# Patient Record
Sex: FEMALE | Race: WHITE | NOT HISPANIC OR LATINO | URBAN - METROPOLITAN AREA
[De-identification: names, ages, dates, MRNs, and addresses within clinical notes are randomized per-mention and may not be internally consistent; named-entity substitution may affect disease eponyms.]

---
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
## 2024-06-04 ENCOUNTER — NON-APPOINTMENT (OUTPATIENT)
Age: 19
End: 2024-06-04

## 2024-06-05 ENCOUNTER — APPOINTMENT (OUTPATIENT)
Dept: NEUROLOGY | Facility: CLINIC | Age: 19
End: 2024-06-05
Payer: COMMERCIAL

## 2024-06-05 VITALS
HEIGHT: 66 IN | SYSTOLIC BLOOD PRESSURE: 119 MMHG | DIASTOLIC BLOOD PRESSURE: 77 MMHG | WEIGHT: 151 LBS | OXYGEN SATURATION: 97 % | TEMPERATURE: 97.9 F | HEART RATE: 76 BPM | BODY MASS INDEX: 24.27 KG/M2

## 2024-06-05 DIAGNOSIS — Z86.39 PERSONAL HISTORY OF OTHER ENDOCRINE, NUTRITIONAL AND METABOLIC DISEASE: ICD-10-CM

## 2024-06-05 DIAGNOSIS — Z82.0 FAMILY HISTORY OF EPILEPSY AND OTHER DISEASES OF THE NERVOUS SYSTEM: ICD-10-CM

## 2024-06-05 DIAGNOSIS — Z78.9 OTHER SPECIFIED HEALTH STATUS: ICD-10-CM

## 2024-06-05 DIAGNOSIS — R25.9 UNSPECIFIED ABNORMAL INVOLUNTARY MOVEMENTS: ICD-10-CM

## 2024-06-05 DIAGNOSIS — Z84.89 FAMILY HISTORY OF OTHER SPECIFIED CONDITIONS: ICD-10-CM

## 2024-06-05 DIAGNOSIS — M54.2 CERVICALGIA: ICD-10-CM

## 2024-06-05 DIAGNOSIS — H57.11 OCULAR PAIN, RIGHT EYE: ICD-10-CM

## 2024-06-05 PROCEDURE — 99205 OFFICE O/P NEW HI 60 MIN: CPT

## 2024-06-05 PROCEDURE — G2211 COMPLEX E/M VISIT ADD ON: CPT | Mod: NC,1L

## 2024-06-05 RX ORDER — NORETHINDRONE ACETATE AND ETHINYL ESTRADIOL, AND FERROUS FUMARATE 1MG-20(24)
KIT ORAL
Refills: 0 | Status: ACTIVE | COMMUNITY

## 2024-06-05 RX ORDER — INSULIN LISPRO 100 [IU]/ML
INJECTION, SOLUTION INTRAVENOUS; SUBCUTANEOUS
Refills: 0 | Status: ACTIVE | COMMUNITY

## 2024-06-05 RX ORDER — LEVOTHYROXINE SODIUM 88 UG/1
88 TABLET ORAL
Refills: 0 | Status: ACTIVE | COMMUNITY

## 2024-06-05 NOTE — HISTORY OF PRESENT ILLNESS
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  Patient sustained bites on the inside of her cheek bilaterally.  This only occurred at night.  It resolved after a couple of weeks.  About 3 weeks ago patient started experiencing neck pain with no radiation down her neck.  Patient also experienced right arm muscle twitches along with a stabbing sensation in the forearm and medial hand.  Patient states that she also has experienced right eye pain that was not associated with movement.  Currently the symptoms have resolved.

## 2024-06-05 NOTE — DISCUSSION/SUMMARY
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  Will check EEG for possible seizure activity.  With her right arm muscle twitching will have her return for nerve conduction and EMG studies for evaluation of benign fasciculation syndrome.  Patient also has neck pain but with no radiation and I suspect that must be musculoskeletal pain.  Patient will obtain an MRI of the brain with and without contrast given the pain in the right eye and the family history of multiple sclerosis.  Similarly will check MRI of the cervical spine with and without contrast for any lesions that might be causing her neck pain and muscle twitching.  I spent the time noted on the day of this patient encounter preparing for, review of medical records,review of pertinent diagnostic studies, providing and documenting the above E/M service and counseling and educate patient on differential, workup, disease course, and treatment/management. Education was provided to the patient during this encounter. All questions and concerns were answered and addressed in detail. The patient verbalized understanding and agreed to plan. Patient was advised to continue to monitor for neurologic symptoms and to notify my office or go to the nearest emergency room if there are any changes. Any orders/referrals and communications were provided as well. Side effects of the above medications were discussed in detail including but not limited to applicable black box warning and teratogenicity as appropriate. Patient was advised to bring previous records to my office.

## 2024-06-05 NOTE — PHYSICAL EXAM
[General Appearance - Alert] : alert [Oriented To Time, Place, And Person] : oriented to person, place, and time [Person] : oriented to person [Place] : oriented to place [Time] : oriented to time [Short Term Intact] : short term memory intact [Fluency] : fluency intact [Current Events] : adequate knowledge of current events [Cranial Nerves Optic (II)] : visual acuity intact bilaterally,  visual fields full to confrontation, pupils equal round and reactive to light [Cranial Nerves Oculomotor (III)] : extraocular motion intact [Cranial Nerves Trigeminal (V)] : facial sensation intact symmetrically [Cranial Nerves Facial (VII)] : face symmetrical [Cranial Nerves Vestibulocochlear (VIII)] : hearing was intact bilaterally [Cranial Nerves Accessory (XI - Cranial And Spinal)] : head turning and shoulder shrug symmetric [Motor Tone] : muscle tone was normal in all four extremities [Cranial Nerves Hypoglossal (XII)] : there was no tongue deviation with protrusion [Motor Strength] : muscle strength was normal in all four extremities [Sensation Tactile Decrease] : light touch was intact [Abnormal Walk] : normal gait [Coordination - Dysmetria Impaired Finger-to-Nose Bilateral] : not present [2+] : Patella left 2+

## 2024-06-12 ENCOUNTER — RESULT REVIEW (OUTPATIENT)
Age: 19
End: 2024-06-12

## 2024-06-19 ENCOUNTER — APPOINTMENT (OUTPATIENT)
Dept: MRI IMAGING | Facility: CLINIC | Age: 19
End: 2024-06-19

## 2024-06-19 PROCEDURE — 0866T QUAN MRI ALYS BRN W/DX MRI: CPT

## 2024-06-19 PROCEDURE — A9585: CPT | Mod: NC,JW

## 2024-06-19 PROCEDURE — 70553 MRI BRAIN STEM W/O & W/DYE: CPT

## 2024-06-19 PROCEDURE — 72156 MRI NECK SPINE W/O & W/DYE: CPT

## 2024-06-21 ENCOUNTER — NON-APPOINTMENT (OUTPATIENT)
Age: 19
End: 2024-06-21

## 2024-07-11 ENCOUNTER — TRANSCRIPTION ENCOUNTER (OUTPATIENT)
Age: 19
End: 2024-07-11

## 2024-07-11 ENCOUNTER — APPOINTMENT (OUTPATIENT)
Dept: NEUROLOGY | Facility: CLINIC | Age: 19
End: 2024-07-11
Payer: COMMERCIAL

## 2024-07-11 PROCEDURE — 95816 EEG AWAKE AND DROWSY: CPT

## 2024-07-31 ENCOUNTER — APPOINTMENT (OUTPATIENT)
Dept: NEUROLOGY | Facility: CLINIC | Age: 19
End: 2024-07-31
Payer: COMMERCIAL

## 2024-07-31 VITALS
BODY MASS INDEX: 24.27 KG/M2 | TEMPERATURE: 97.9 F | HEIGHT: 66 IN | DIASTOLIC BLOOD PRESSURE: 78 MMHG | WEIGHT: 151 LBS | HEART RATE: 79 BPM | SYSTOLIC BLOOD PRESSURE: 118 MMHG

## 2024-07-31 DIAGNOSIS — R25.9 UNSPECIFIED ABNORMAL INVOLUNTARY MOVEMENTS: ICD-10-CM

## 2024-07-31 PROCEDURE — 95910 NRV CNDJ TEST 7-8 STUDIES: CPT

## 2024-07-31 PROCEDURE — 95886 MUSC TEST DONE W/N TEST COMP: CPT

## 2024-07-31 NOTE — PROCEDURE
[FreeTextEntry1] :    [facility] Neurology and Electrophysiology  Nerve Conduction & EMG Report      Full Name:	Angie Bernardo	Gender:	Female MRN:	[id number]	YOB: 2005    Visit Date:	7/31/2024 13:34 Age:	19 Years Examining Physician:	Miladis Buchanan MD Referring Physician:	Miladis Buchanan MD Height:	5 feet 6 inch Weight:	151 lbs    ________________________________________  Conclusion:   Sensory nerve conductions Bilateral median and bilateral ulnar sensory nerve action potential had normal latency, normal amplitude and normal conduction velocity.  Motor nerve conductions Bilateral median and right ulnar nerve compound motor action potential had normal latency, normal amplitude and normal conduction velocity. F-wave latency of the bilateral median and right ulnar nerves were within normal limits.  Needle EMG Needle EMG was performed using a disposable concentric needle in the following muscles:  Deltoid, biceps, edc, fdi, pronator teres had no spontaneous activity and normal motor units.   Summary: normal study. There is no electrophysiologic evidence of large fiber neuropathy, myopathy or cervical radiculopathy.  Patient was not noted to have any fasciculations on exam.  Patient will follow-up with me.Clinical and radiologic correlation is advised.  Thank you for the consult, Miladis Buchanan MD Diplomat, American Board of Neurology and Psychiatry  ________________________________________           Sensory NCS    Nerve / Sites	Rec. Site	Onset Lat	Peak Lat	NP Amp	PP Amp	Segments	Distance	Velocity 		ms	ms	V	V		cm	m/s R Median - Dig II (Antidromic)    Wrist	Index	2.06	2.65	47.8	95.9	Wrist - Index	11	53 L Median - Dig II (Antidromic)    Wrist	Index	2.15	2.60	34.5	101.5	Wrist - Index	12	56 R Ulnar - Dig V (Antidromic)    Wrist	Dig V	1.88	2.35	24.5	164.7	Wrist - Dig V	10	53 L Ulnar - Dig V (Antidromic)    Wrist	Dig V	1.65	2.31	90.7	68.7	Wrist - Dig V	10	61    Motor NCS    Nerve / Sites	Muscle	Latency	Amplitude	Rel Amp	Segments	Distance	Lat Diff	Velocity	Durat 		ms	mV	%		cm	ms	m/s	ms R Median - APB    Wrist	APB	2.15	10.8		Wrist - APB	8			6.69    Elbow	APB	6.08	9.9	91.8	Elbow - Wrist	21	3.94	53	7.06 L Median - APB    Wrist	APB	2.15	20.3		Wrist - APB	5			6.33    Elbow	APB	6.21	14.8	72.9	Elbow - Wrist	23	4.06	57	6.85 R Ulnar - ADM    Wrist	ADM	2.02	13.8	100	Wrist - ADM	5			6.81    B.Elbow	ADM	6.23	13.5	98.2	B.Elbow - Wrist	22	4.21	52	7.04    A.Elbow	ADM	7.48	14.0	103	A.Elbow - B.Elbow	9	1.25	72	6.71    F  Wave    Nerve	F min 	ms R Median - APB	25.2 R Ulnar - ADM	26.6 L Median - APB	26.4    EMG Summary Table	 	Spontaneous	MUAP	Recruitment Muscle	Nerve	Roots	IA	Fib	PSW	Fasc	Comments	Amp	Dur.	PPP	Pattern R. Deltoid	Axillary	C5-C6	N	None	None	None	None	N	N	N	N R. Biceps brachii	Musculocutaneous	C5-C6	N	None	None	None	None	N	N	N	N R. Extensor digitorum communis	Radial	C7-C8	N	None	None	None	None	N	N	N	N R. First dorsal interosseous	Ulnar	C8-T1	N	None	None	None	None	N	N	N	N R. Pronator teres	Median	C6-C7	N	None	None	None	None	N	N	N	N